# Patient Record
Sex: FEMALE | Race: WHITE | Employment: UNEMPLOYED | URBAN - METROPOLITAN AREA
[De-identification: names, ages, dates, MRNs, and addresses within clinical notes are randomized per-mention and may not be internally consistent; named-entity substitution may affect disease eponyms.]

---

## 2018-08-07 ENCOUNTER — OFFICE VISIT (OUTPATIENT)
Dept: FAMILY MEDICINE | Facility: CLINIC | Age: 63
End: 2018-08-07
Payer: MEDICARE

## 2018-08-07 VITALS
SYSTOLIC BLOOD PRESSURE: 132 MMHG | DIASTOLIC BLOOD PRESSURE: 77 MMHG | WEIGHT: 190 LBS | TEMPERATURE: 98.3 F | RESPIRATION RATE: 18 BRPM | OXYGEN SATURATION: 94 % | HEART RATE: 68 BPM | HEIGHT: 61 IN | BODY MASS INDEX: 35.87 KG/M2

## 2018-08-07 DIAGNOSIS — Z79.899 MEDICATION MANAGEMENT: Primary | ICD-10-CM

## 2018-08-07 DIAGNOSIS — Z00.00 ROUTINE GENERAL MEDICAL EXAMINATION AT A HEALTH CARE FACILITY: ICD-10-CM

## 2018-08-07 DIAGNOSIS — Z79.899 HIGH RISK MEDICATIONS (NOT ANTICOAGULANTS) LONG-TERM USE: Primary | ICD-10-CM

## 2018-08-07 DIAGNOSIS — R32 URINARY INCONTINENCE, UNSPECIFIED TYPE: ICD-10-CM

## 2018-08-07 DIAGNOSIS — F17.200 TOBACCO USE DISORDER: ICD-10-CM

## 2018-08-07 LAB
ALBUMIN SERPL-MCNC: 3.6 G/DL (ref 3.4–5)
ALP SERPL-CCNC: 166 U/L (ref 40–150)
ALT SERPL W P-5'-P-CCNC: 23 U/L (ref 0–50)
ANION GAP SERPL CALCULATED.3IONS-SCNC: 4 MMOL/L (ref 3–14)
AST SERPL W P-5'-P-CCNC: 14 U/L (ref 0–45)
BILIRUB SERPL-MCNC: 0.5 MG/DL (ref 0.2–1.3)
BUN SERPL-MCNC: 12 MG/DL (ref 7–30)
CALCIUM SERPL-MCNC: 9.2 MG/DL (ref 8.5–10.1)
CHLORIDE SERPL-SCNC: 106 MMOL/L (ref 94–109)
CO2 SERPL-SCNC: 32 MMOL/L (ref 20–32)
CREAT SERPL-MCNC: 0.83 MG/DL (ref 0.52–1.04)
GFR SERPL CREATININE-BSD FRML MDRD: 70 ML/MIN/1.7M2
GLUCOSE SERPL-MCNC: 113 MG/DL (ref 70–99)
POTASSIUM SERPL-SCNC: 4.7 MMOL/L (ref 3.4–5.3)
PROT SERPL-MCNC: 7 G/DL (ref 6.8–8.8)
SODIUM SERPL-SCNC: 142 MMOL/L (ref 133–144)

## 2018-08-07 PROCEDURE — 93000 ELECTROCARDIOGRAM COMPLETE: CPT | Performed by: FAMILY MEDICINE

## 2018-08-07 PROCEDURE — 99396 PREV VISIT EST AGE 40-64: CPT | Performed by: FAMILY MEDICINE

## 2018-08-07 PROCEDURE — 36415 COLL VENOUS BLD VENIPUNCTURE: CPT | Performed by: REGISTERED NURSE

## 2018-08-07 PROCEDURE — 80053 COMPREHEN METABOLIC PANEL: CPT | Performed by: REGISTERED NURSE

## 2018-08-07 RX ORDER — PROPRANOLOL HYDROCHLORIDE 10 MG/1
10 TABLET ORAL 3 TIMES DAILY
COMMUNITY
End: 2018-10-16

## 2018-08-07 RX ORDER — BUPROPION HYDROCHLORIDE 100 MG/1
100 TABLET, EXTENDED RELEASE ORAL DAILY
COMMUNITY

## 2018-08-07 RX ORDER — LAMOTRIGINE 100 MG/1
100 TABLET, EXTENDED RELEASE ORAL DAILY
COMMUNITY

## 2018-08-07 NOTE — PROGRESS NOTES
SUBJECTIVE:   CC: Mandi Araiza is an 62 year old woman who presents for preventive health visit.     Healthy Habits:    Do you get at least three servings of calcium containing foods daily (dairy, green leafy vegetables, etc.)? yes    Amount of exercise or daily activities, outside of work: 1 day(s) per week    Problems taking medications regularly No    Medication side effects: No    Have you had an eye exam in the past two years? yes    Do you see a dentist twice per year? no    Do you have sleep apnea, excessive snoring or daytime drowsiness?no      Hyperlipidemia Follow-Up      Rate your low fat/cholesterol diet?: good    Taking statin?  Yes, no muscle aches from statin    Other lipid medications/supplements?:  none    Depression and Anxiety Follow-Up    Status since last visit: Worsened     Other associated symptoms:None    Complicating factors:     Significant life event: Yes-  Family      Current substance abuse: None    No flowsheet data found.  MAGGIE-7 SCORE 1/18/2013 8/16/2013 2/7/2014   Total Score 6 6 8       PHQ-9  English  PHQ-9   Any Language  MAGGIE-7  Suicide Assessment Five-step Evaluation and Treatment (SAFE-T)    Today's PHQ-2 Score:   PHQ-2 ( 1999 [employer]) 8/7/2018 12/9/2013   Q1: Little interest or pleasure in doing things 0 0   Q2: Feeling down, depressed or hopeless 0 0   PHQ-2 Score 0 0       Abuse: Current or Past(Physical, Sexual or Emotional)- No  Do you feel safe in your environment - Yes    Social History   Substance Use Topics     Smoking status: Current Every Day Smoker     Packs/day: 0.50     Years: 40.00     Types: Cigarettes     Smokeless tobacco: Never Used     Alcohol use No     If you drink alcohol do you typically have >3 drinks per day or >7 drinks per week? No                     Reviewed orders with patient.  Reviewed health maintenance and updated orders accordingly - Yes  Labs reviewed in [facility]    Patient over age 50, mutual decision to screen reflected in health  "maintenance.    Pertinent mammograms are reviewed under the imaging tab.  History of abnormal Pap smear: Status post benign hysterectomy. Health Maintenance and Surgical History updated.     Reviewed and updated as needed this visit by clinical staff  Tobacco  Soc Hx        Reviewed and updated as needed this visit by Provider            ROS:  CONSTITUTIONAL: NEGATIVE for fever, chills, change in weight  INTEGUMENTARY/SKIN: NEGATIVE for worrisome rashes, moles or lesions  EYES: NEGATIVE for vision changes or irritation  ENT: NEGATIVE for ear, mouth and throat problems  RESP: NEGATIVE for significant cough or SOB  BREAST: NEGATIVE for masses, tenderness or discharge  CV: NEGATIVE for chest pain, palpitations or peripheral edema  GI: NEGATIVE for nausea, abdominal pain, heartburn, or change in bowel habits  : NEGATIVE for unusual urinary or vaginal symptoms. No vaginal bleeding.  MUSCULOSKELETAL: NEGATIVE for significant arthralgias or myalgia  NEURO: NEGATIVE for weakness, dizziness or paresthesias  PSYCHIATRIC: NEGATIVE for changes in mood or affect     OBJECTIVE:   /77  Pulse 68  Temp 98.3  F (36.8  C)  Resp 18  Ht 5' 1\" (1.549 m)  Wt 190 lb (86.2 kg)  SpO2 94%  BMI 35.9 kg/m2  EXAM:  GENERAL APPEARANCE: healthy, alert and no distress  EYES: Eyes grossly normal to inspection, PERRL and conjunctivae and sclerae normal  HENT: ear canals and TM's normal, nose and mouth without ulcers or lesions, oropharynx clear and oral mucous membranes moist  NECK: no adenopathy, no asymmetry, masses, or scars and thyroid normal to palpation  RESP: lungs clear to auscultation - no rales, rhonchi or wheezes  BREAST: normal without masses, tenderness or nipple discharge and no palpable axillary masses or adenopathy  CV: regular rate and rhythm, normal S1 S2, no S3 or S4, no murmur, click or rub, no peripheral edema and peripheral pulses strong  ABDOMEN: soft, nontender, no hepatosplenomegaly, no masses and bowel sounds " "normal  MS: no musculoskeletal defects are noted and gait is age appropriate without ataxia  SKIN: no suspicious lesions or rashes  NEURO: Normal strength and tone, sensory exam grossly normal, mentation intact and speech normal  PSYCH: mentation appears normal and affect normal/bright    Diagnostic Test Results:  none     ASSESSMENT/PLAN:   Mandi was seen today for physical.    Diagnoses and all orders for this visit:    High risk medications (not anticoagulants) long-term use  -     EKG 12-lead complete w/read - Clinics    Routine general medical examination at a health care facility    Urinary incontinence, unspecified type  -     OB/GYN REFERRAL    Tobacco use disorder    Other orders  -     Cancel: GLUCOSE        COUNSELING:   Reviewed preventive health counseling, as reflected in patient instructions       Regular exercise       Healthy diet/nutrition    BP Readings from Last 1 Encounters:   08/07/18 132/77     Estimated body mass index is 35.9 kg/(m^2) as calculated from the following:    Height as of this encounter: 5' 1\" (1.549 m).    Weight as of this encounter: 190 lb (86.2 kg).      Weight management plan: Discussed healthy diet and exercise guidelines and patient will follow up in 12 months in clinic to re-evaluate.     reports that she has been smoking Cigarettes.  She has a 20.00 pack-year smoking history. She has never used smokeless tobacco.  Tobacco Cessation Action Plan: Information offered: Patient not interested at this time    Counseling Resources:  ATP IV Guidelines  Pooled Cohorts Equation Calculator  Breast Cancer Risk Calculator  FRAX Risk Assessment  ICSI Preventive Guidelines  Dietary Guidelines for Americans, 2010  USDA's MyPlate  ASA Prophylaxis  Lung CA Screening    Chaz Tineo MD  [facility]  "

## 2018-08-07 NOTE — MR AVS SNAPSHOT
After Visit Summary   8/7/2018    Mandi Araiza    MRN: [id number]           Patient Information     Date Of Birth          1955        Visit Information        Provider Department      8/7/2018 10:20 AM Chaz Tineo MD [facility]        Today's Diagnoses     High risk medications (not anticoagulants) long-term use    -  1    Routine general medical examination at a health care facility        Urinary incontinence, unspecified type        Tobacco use disorder          Care Instructions      Preventive Health Recommendations  Female Ages 50 - 64    Yearly exam: See your health care provider every year in order to  o Review health changes.   o Discuss preventive care.    o Review your medicines if your doctor has prescribed any.      Get a Pap test every three years (unless you have an abnormal result and your provider advises testing more often).    If you get Pap tests with HPV test, you only need to test every 5 years, unless you have an abnormal result.     You do not need a Pap test if your uterus was removed (hysterectomy) and you have not had cancer.    You should be tested each year for STDs (sexually transmitted diseases) if you're at risk.     Have a mammogram every 1 to 2 years.    Have a colonoscopy at age 50, or have a yearly FIT test (stool test). These exams screen for colon cancer.      Have a cholesterol test every 5 years, or more often if advised.    Have a diabetes test (fasting glucose) every three years. If you are at risk for diabetes, you should have this test more often.     If you are at risk for osteoporosis (brittle bone disease), think about having a bone density scan (DEXA).    Shots: Get a flu shot each year. Get a tetanus shot every 10 years.    Nutrition:     Eat at least 5 servings of fruits and vegetables each day.    Eat whole-grain bread, whole-wheat pasta and brown rice instead of white grains and rice.    Get adequate Calcium and  Vitamin D.     Lifestyle    Exercise at least 150 minutes a week (30 minutes a day, 5 days a week). This will help you control your weight and prevent disease.    Limit alcohol to one drink per day.    No smoking.     Wear sunscreen to prevent skin cancer.     See your dentist every six months for an exam and cleaning.    See your eye doctor every 1 to 2 years.            Follow-ups after your visit        Additional Services     OB/GYN REFERRAL       Your provider has referred you to:  LADI: [facility] [phone]   [email or web address]    Please be aware that coverage of these services is subject to the terms and limitations of your health insurance plan.  Call member services at your health plan with any benefit or coverage questions.      Please bring the following with you to your appointment:    (1) Any X-Rays, CTs or MRIs which have been performed.  Contact the facility where they were done to arrange for  prior to your scheduled appointment.   (2) List of current medications   (3) This referral request   (4) Any documents/labs given to you for this referral                  Who to contact     If you have questions or need follow up information about today's clinic visit or your schedule please contact [facility] directly at [phone].  Normal or non-critical lab and imaging results will be communicated to you by MyChart, letter or phone within 4 business days after the clinic has received the results. If you do not hear from us within 7 days, please contact the clinic through MyChart or phone. If you have a critical or abnormal lab result, we will notify you by phone as soon as possible.  Submit refill requests through [employer] or call your pharmacy and they will forward the refill request to us. Please allow 3 business days for your refill to be completed.          Additional Information About Your Visit        Care EveryWhere ID      "This is your Care EveryWhere ID. This could be used by other organizations to access your [place] medical records  [id number]        Your Vitals Were     Pulse Temperature Respirations Height Pulse Oximetry BMI (Body Mass Index)    68 98.3  F (36.8  C) 18 5' 1\" (1.549 m) 94% 35.9 kg/m2       Blood Pressure from Last 3 Encounters:   08/07/18 132/77   08/27/14 94/66   02/07/14 129/79    Weight from Last 3 Encounters:   08/07/18 190 lb (86.2 kg)   02/07/14 183 lb (83 kg)   01/06/14 183 lb (83 kg)              We Performed the Following     EKG 12-lead complete w/read - Clinics     OB/GYN REFERRAL          Today's Medication Changes          These changes are accurate as of 8/7/18 10:51 AM.  If you have any questions, ask your nurse or doctor.               Stop taking these medicines if you haven't already. Please contact your care team if you have questions.     LORazepam 1 MG tablet   Commonly known as:  ATIVAN   Stopped by:  Chaz Tineo MD                    Primary Care Provider Office Phone # Fax #    Digna ARLEN Herron PA-C [phone]       [facility] [address]   [facility] [address]        Equal Access to Services     NICOLE MORTON AH: Hadii dinah ku hadasho Soomaali, waaxda luqadaha, qaybta kaalmada adeegyada, raf gatica hayrobert summers . So [facility] [phone].    ATENCIÓN: Si habla español, tiene a thompson disposición servicios gratuitos de asistencia lingüística. Llame al [phone].    We comply with applicable federal civil rights laws and [place] laws. We do not discriminate on the basis of race, color, national origin, age, disability, sex, sexual orientation, or gender identity.            Thank you!     Thank you for choosing [facility]  for your care. Our goal is always to provide you with excellent care. Hearing back from our patients is one way we can continue to improve our services. Please take a few minutes to complete the written survey " that you may receive in the mail after your visit with us. Thank you!             Your Updated Medication List - Protect others around you: Learn how to safely use, store and throw away your medicines at www.disposemymeds.org.          This list is accurate as of 8/7/18 10:51 AM.  Always use your most recent med list.                   Brand Name Dispense Instructions for use Diagnosis    atorvastatin 80 MG tablet    LIPITOR    90 tablet    Take 1 tablet (80 mg) by mouth daily    Hyperlipidemia LDL goal <130       B-12 1500 MCG Tbcr      Take 1,500 mcg by mouth daily        buPROPion 100 MG 12 hr tablet    WELLBUTRIN SR     Take 100 mg by mouth daily        DIPHENHIST PO      Take 25 mg by mouth At Bedtime        DULoxetine 60 MG EC capsule    CYMBALTA    90 capsule    Take 1 capsule (60 mg) by mouth daily    Mild major depression (H), Generalised anxiety disorder       FLUoxetine 20 MG capsule    PROZAC    90 capsule    Take 3 capsules (60 mg) by mouth daily    Generalised anxiety disorder, Mild major depression (H)       lamoTRIgine 100 MG 24 hr tablet    LaMICtal     Take 100 mg by mouth daily        meclizine 25 MG tablet    ANTIVERT    40 tablet    Take 1-2 tablets (25-50 mg) by mouth every 6 hours as needed for dizziness Up to a maximum of 4 tablets daily.        promethazine 25 MG tablet    PHENERGAN    20 tablet    Take 0.5-1 tablets (12.5-25 mg) by mouth every 6 hours as needed for nausea (or for dizziness or vertigo.)        propranolol 10 MG tablet    INDERAL     Take 10 mg by mouth 3 times daily        sulindac 200 MG tablet    CLINORIL    180 tablet    Take 1 tablet (200 mg) by mouth 2 times daily    LBP (low back pain)       VITAMIN B6 PO      Take 50 mg by mouth daily

## 2018-08-08 ASSESSMENT — PATIENT HEALTH QUESTIONNAIRE - PHQ9: SUM OF ALL RESPONSES TO PHQ QUESTIONS 1-9: 13

## 2018-08-09 ENCOUNTER — TELEPHONE (OUTPATIENT)
Dept: FAMILY MEDICINE | Facility: CLINIC | Age: 63
End: 2018-08-09

## 2018-08-09 NOTE — TELEPHONE ENCOUNTER
Patient called and is concerned about her EKG.  Patient has an OV with PCP to establish care.  Patient has EKG done that was ordered because by her psychiatrist. The reports was given to the psychiatrist and was told to contact her primary doctor regarding the results.  Will send to covering provider to review the EKG and advise.    Thank you  Shayy BROWN RN

## 2018-08-09 NOTE — TELEPHONE ENCOUNTER
EKG shows changes indicative of possible pulmonary disease. She should be seen by PCP for office visit for full evaluation. In the absence of any chest pain or other chest symptoms I would suggest office visit within throughout next month. If having chest symptoms then be seen immediately UC if need be.

## 2018-08-10 NOTE — TELEPHONE ENCOUNTER
I have attempted to contact this patient by phone with the following results: left message to return my call on answering machine.    Sheyla Dillard RN

## 2018-08-20 ENCOUNTER — OFFICE VISIT (OUTPATIENT)
Dept: FAMILY MEDICINE | Facility: CLINIC | Age: 63
End: 2018-08-20
Payer: MEDICARE

## 2018-08-20 VITALS
DIASTOLIC BLOOD PRESSURE: 60 MMHG | BODY MASS INDEX: 35.87 KG/M2 | RESPIRATION RATE: 18 BRPM | TEMPERATURE: 98.3 F | WEIGHT: 190 LBS | HEIGHT: 61 IN | SYSTOLIC BLOOD PRESSURE: 122 MMHG | HEART RATE: 78 BPM

## 2018-08-20 DIAGNOSIS — F17.200 TOBACCO USE DISORDER: ICD-10-CM

## 2018-08-20 DIAGNOSIS — Z12.11 SPECIAL SCREENING FOR MALIGNANT NEOPLASMS, COLON: Primary | ICD-10-CM

## 2018-08-20 DIAGNOSIS — R94.31 ABNORMAL ELECTROCARDIOGRAM: ICD-10-CM

## 2018-08-20 DIAGNOSIS — R32 URINARY INCONTINENCE, UNSPECIFIED TYPE: ICD-10-CM

## 2018-08-20 PROCEDURE — 99213 OFFICE O/P EST LOW 20 MIN: CPT | Performed by: FAMILY MEDICINE

## 2018-08-20 NOTE — MR AVS SNAPSHOT
After Visit Summary   8/20/2018    Mandi Araiza    MRN: [id number]           Patient Information     Date Of Birth          1955        Visit Information        Provider Department      8/20/2018 1:00 PM Chaz Tineo MD [facility]        Today's Diagnoses     Special screening for malignant neoplasms, colon    -  1    Abnormal electrocardiogram        Urinary incontinence, unspecified type        Tobacco use disorder          Care Instructions          Thank you for choosing [facility].  You may be receiving a survey in the mail from [facility] regarding your visit today.  Please take a few minutes to complete and return the survey to let us know how we are doing.      Our Clinic hours are:  Mondays    7:20 am - 7 pm  Tues -  Fri  7:20 am - 5 pm    Clinic Phone: [phone]    The clinic lab opens at 7:30 am Mon - Fri and appointments are required.    [facility]. [phone]  Monday  8 am - 7pm  Tues - Fri 8 am - 5:30 pm                 Follow-ups after your visit        Additional Services     GASTROENTEROLOGY ADULT REF PROCEDURE ONLY [facility] [phone]; No Provider Preference       Last Lab Result: Creatinine (mg/dL)       Date                     Value                 08/07/2018               0.83             ----------  There is no height or weight on file to calculate BMI.     Needed:  No  Language:  English    Patient will be contacted to schedule procedure.     Please be aware that coverage of these services is subject to the terms and limitations of your health insurance plan.  Call member services at your health plan with any benefit or coverage questions.  Any procedures must be performed at a [place] facility OR coordinated by your clinic's referral office.    Please bring the following with you to your appointment:    (1) Any X-Rays, CTs or MRIs which have been performed.  Contact the facility where they  were done to arrange for  prior to your scheduled appointment.    (2) List of current medications   (3) This referral request   (4) Any documents/labs given to you for this referral            OB/GYN REFERRAL       Your provider has referred you to:  [facility]: [facility] [phone]   [email or web address]    Please be aware that coverage of these services is subject to the terms and limitations of your health insurance plan.  Call member services at your health plan with any benefit or coverage questions.      Please bring the following with you to your appointment:    (1) Any X-Rays, CTs or MRIs which have been performed.  Contact the facility where they were done to arrange for  prior to your scheduled appointment.   (2) List of current medications   (3) This referral request   (4) Any documents/labs given to you for this referral                  Who to contact     If you have questions or need follow up information about today's clinic visit or your schedule please contact [facility] directly at [phone].  Normal or non-critical lab and imaging results will be communicated to you by MyChart, letter or phone within 4 business days after the clinic has received the results. If you do not hear from us within 7 days, please contact the clinic through MyChart or phone. If you have a critical or abnormal lab result, we will notify you by phone as soon as possible.  Submit refill requests through [employer] or call your pharmacy and they will forward the refill request to us. Please allow 3 business days for your refill to be completed.          Additional Information About Your Visit        Care EveryWhere ID     This is your Care EveryWhere ID. This could be used by other organizations to access your [place] medical records  [id number]        Your Vitals Were     Pulse Temperature Respirations Height BMI (Body Mass Index)       78 98.3  F  "(36.8  C) 18 5' 1\" (1.549 m) 35.9 kg/m2        Blood Pressure from Last 3 Encounters:   08/20/18 122/60   08/07/18 132/77   08/27/14 94/66    Weight from Last 3 Encounters:   08/20/18 190 lb (86.2 kg)   08/07/18 190 lb (86.2 kg)   02/07/14 183 lb (83 kg)              We Performed the Following     DEPRESSION ACTION PLAN (DAP)     GASTROENTEROLOGY ADULT REF PROCEDURE ONLY [facility] [phone]; No Provider Preference     OB/GYN REFERRAL        Primary Care Provider Office Phone # Fax #    Chaz Tineo [phone] [address] [facility] [address]        Equal Access to Services     NICOLE MORTON : Hadii aad ku hadasho Soomaali, waaxda luqadaha, qaybta kaalmada adeegyada, raf summers . So [facility] [phone].    ATENCIÓN: Si habla español, tiene a thompson disposición servicios gratuitos de asistencia lingüística. Llame al [phone].    We comply with applicable federal civil rights laws and [place] laws. We do not discriminate on the basis of race, color, national origin, age, disability, sex, sexual orientation, or gender identity.            Thank you!     Thank you for choosing [facility]  for your care. Our goal is always to provide you with excellent care. Hearing back from our patients is one way we can continue to improve our services. Please take a few minutes to complete the written survey that you may receive in the mail after your visit with us. Thank you!             Your Updated Medication List - Protect others around you: Learn how to safely use, store and throw away your medicines at www.disposemymeds.org.          This list is accurate as of 8/20/18  2:20 PM.  Always use your most recent med list.                   Brand Name Dispense Instructions for use Diagnosis    atorvastatin 80 MG tablet    LIPITOR    90 tablet    Take 1 tablet (80 mg) by mouth daily    Hyperlipidemia LDL goal <130       B-12 1500 MCG Tbcr      " Take 1,500 mcg by mouth daily        buPROPion 100 MG 12 hr tablet    WELLBUTRIN SR     Take 100 mg by mouth daily        DIPHENHIST PO      Take 25 mg by mouth At Bedtime        DULoxetine 60 MG EC capsule    CYMBALTA    90 capsule    Take 1 capsule (60 mg) by mouth daily    Mild major depression (H), Generalised anxiety disorder       FLUoxetine 20 MG capsule    PROZAC    90 capsule    Take 3 capsules (60 mg) by mouth daily    Generalised anxiety disorder, Mild major depression (H)       lamoTRIgine 100 MG 24 hr tablet    LaMICtal     Take 100 mg by mouth daily        meclizine 25 MG tablet    ANTIVERT    40 tablet    Take 1-2 tablets (25-50 mg) by mouth every 6 hours as needed for dizziness Up to a maximum of 4 tablets daily.        promethazine 25 MG tablet    PHENERGAN    20 tablet    Take 0.5-1 tablets (12.5-25 mg) by mouth every 6 hours as needed for nausea (or for dizziness or vertigo.)        propranolol 10 MG tablet    INDERAL     Take 10 mg by mouth 3 times daily        sulindac 200 MG tablet    CLINORIL    180 tablet    Take 1 tablet (200 mg) by mouth 2 times daily    LBP (low back pain)       VITAMIN B6 PO      Take 50 mg by mouth daily

## 2018-08-20 NOTE — PATIENT INSTRUCTIONS
Thank you for choosing [facility].  You may be receiving a survey in the mail from [facility] regarding your visit today.  Please take a few minutes to complete and return the survey to let us know how we are doing.      Our Clinic hours are:  Mondays    7:20 am - 7 pm  Tues - Fri  7:20 am - 5 pm    Clinic Phone: [phone]    The clinic lab opens at 7:30 am Mon - Fri and appointments are required.    [facility]. [phone]  Monday  8 am - 7pm  Tues - Fri 8 am - 5:30 pm

## 2018-08-20 NOTE — PROGRESS NOTES
"  SUBJECTIVE:   Mandi Araiza is a 62 year old female who presents to clinic today for the following health issues:      Chief Complaint   Patient presents with     Results     patient comes into the clinic to discuss her EKG results for 8/7/18             Problem list and histories reviewed & adjusted, as indicated.  Additional history:         Reviewed and updated as needed this visit by clinical staff  Tobacco  Allergies  Meds       Reviewed and updated as needed this visit by Provider      Further history obtained, clarified or corrected by physician:  She comes in because her EKG showed a rightward P axis suggesting possible pulmonary disease and her psychiatry clinic recommended she follow up.  She has no symptoms of chest pain or shortness of breath.  She is a long-term smoker.    OBJECTIVE:  /60  Pulse 78  Temp 98.3  F (36.8  C)  Resp 18  Ht 5' 1\" (1.549 m)  Wt 190 lb (86.2 kg)  BMI 35.9 kg/m2  LUNGS: clear to auscultation, normal breath sounds  CV: RRR without murmur  ABD: BS+, soft, nontender, no masses, no hepatosplenomegaly    ECG: The findings show rightward P axis with incomplete right bundle branch block but minimal change from ECG of 2014    ASSESSMENT:     Abnormal electrocardiogram  Special screening for malignant neoplasms, colon  Urinary incontinence, unspecified type  Tobacco use disorder    PLAN:  We discussed the ECG findings and given her lack of symptoms we talked about workup versus just monitoring and at this point she is comfortable with no further testing but she will call or return if she develops respiratory symptoms or any chest pains.  We talked about the likelihood that she is developing some COPD and that may be having some effect on the ECG and recommended that she stop smoking  She wants to see the gynecologist for her worsening urinary incontinence.    Orders Placed This Encounter     DEPRESSION ACTION PLAN (DAP)     GASTROENTEROLOGY ADULT REF PROCEDURE ONLY " [facility] [phone]; No Provider Preference     OB/GYN REFERRAL

## 2018-08-20 NOTE — LETTER
My Depression Action Plan  Name: Mandi Araiza   Date of Birth 1955  Date: 8/20/2018    My doctor: Digna Herron   My clinic: [facility]  [address]  [facility] [address]  [phone]          GREEN    ZONE   Good Control    What it looks like:     Things are going generally well. You have normal up s and down s. You may even feel depressed from time to time, but bad moods usually last less than a day.   What you need to do:  1. Continue to care for yourself (see self care plan)  2. Check your depression survival kit and update it as needed  3. Follow your physician s recommendations including any medication.  4. Do not stop taking medication unless you consult with your physician first.           YELLOW         ZONE Getting Worse    What it looks like:     Depression is starting to interfere with your life.     It may be hard to get out of bed; you may be starting to isolate yourself from others.    Symptoms of depression are starting to last most all day and this has happened for several days.     You may have suicidal thoughts but they are not constant.   What you need to do:     1. Call your care team, your response to treatment will improve if you keep your care team informed of your progress. Yellow periods are signs an adjustment may need to be made.     2. Continue your self-care, even if you have to fake it!    3. Talk to someone in your support network    4. Open up your depression survival kit           RED    ZONE Medical Alert - Get Help    What it looks like:     Depression is seriously interfering with your life.     You may experience these or other symptoms: You can t get out of bed most days, can t work or engage in other necessary activities, you have trouble taking care of basic hygiene, or basic responsibilities, thoughts of suicide or death that will not go away, self-injurious behavior.     What you need to do:  1. Call your care team  and request a same-day appointment. If they are not available (weekends or after hours) call your local crisis line, emergency room or 911.            Depression Self Care Plan / Survival Kit    Self-Care for Depression  Here s the deal. Your body and mind are really not as separate as most people think.  What you do and think affects how you feel and how you feel influences what you do and think. This means if you do things that people who feel good do, it will help you feel better.  Sometimes this is all it takes.  There is also a place for medication and therapy depending on how severe your depression is, so be sure to consult with your medical provider and/ or Behavioral Health Consultant if your symptoms are worsening or not improving.     In order to better manage my stress, I will:    Exercise  Get some form of exercise, every day. This will help reduce pain and release endorphins, the  feel good  chemicals in your brain. This is almost as good as taking antidepressants!  This is not the same as joining a gym and then never going! (they count on that by the way ) It can be as simple as just going for a walk or doing some gardening, anything that will get you moving.      Hygiene   Maintain good hygiene (Get out of bed in the morning, Make your bed, Brush your teeth, Take a shower, and Get dressed like you were going to work, even if you are unemployed).  If your clothes don't fit try to get ones that do.    Diet  I will strive to eat foods that are good for me, drink plenty of water, and avoid excessive sugar, caffeine, alcohol, and other mood-altering substances.  Some foods that are helpful in depression are: complex carbohydrates, B vitamins, flaxseed, fish or fish oil, fresh fruits and vegetables.    Psychotherapy  I agree to participate in Individual Therapy (if recommended).    Medication  If prescribed medications, I agree to take them.  Missing doses can result in serious side effects.  I understand  that drinking alcohol, or other illicit drug use, may cause potential side effects.  I will not stop my medication abruptly without first discussing it with my provider.    Staying Connected With Others  I will stay in touch with my friends, family members, and my primary care provider/team.    Use your imagination  Be creative.  We all have a creative side; it doesn t matter if it s oil painting, sand castles, or mud pies! This will also kick up the endorphins.    Witness Beauty  (AKA stop and smell the roses) Take a look outside, even in mid-winter. Notice colors, textures. Watch the squirrels and birds.     Service to others  Be of service to others.  There is always someone else in need.  By helping others we can  get out of ourselves  and remember the really important things.  This also provides opportunities for practicing all the other parts of the program.    Humor  Laugh and be silly!  Adjust your TV habits for less news and crime-drama and more comedy.    Control your stress  Try breathing deep, massage therapy, biofeedback, and meditation. Find time to relax each day.     My support system    Clinic Contact:  Phone number:    Contact 1:  Phone number:    Contact 2:  Phone number:    [employer]/:  Phone number:    Therapist:  Phone number:    Local crisis center:    Phone number:    Other community support:  Phone number:

## 2018-08-21 ENCOUNTER — HEALTH MAINTENANCE LETTER (OUTPATIENT)
Age: 63
End: 2018-08-21

## 2018-10-16 ENCOUNTER — OFFICE VISIT (OUTPATIENT)
Dept: FAMILY MEDICINE | Facility: CLINIC | Age: 63
End: 2018-10-16
Payer: MEDICARE

## 2018-10-16 VITALS
HEART RATE: 90 BPM | OXYGEN SATURATION: 96 % | WEIGHT: 192 LBS | TEMPERATURE: 98.3 F | BODY MASS INDEX: 36.25 KG/M2 | DIASTOLIC BLOOD PRESSURE: 60 MMHG | RESPIRATION RATE: 18 BRPM | SYSTOLIC BLOOD PRESSURE: 122 MMHG | HEIGHT: 61 IN

## 2018-10-16 DIAGNOSIS — K04.7 TOOTH ABSCESS: ICD-10-CM

## 2018-10-16 DIAGNOSIS — E78.5 HYPERLIPIDEMIA LDL GOAL <130: ICD-10-CM

## 2018-10-16 DIAGNOSIS — Z23 NEED FOR PROPHYLACTIC VACCINATION AND INOCULATION AGAINST INFLUENZA: Primary | ICD-10-CM

## 2018-10-16 PROCEDURE — 90686 IIV4 VACC NO PRSV 0.5 ML IM: CPT | Performed by: FAMILY MEDICINE

## 2018-10-16 PROCEDURE — G0008 ADMIN INFLUENZA VIRUS VAC: HCPCS | Performed by: FAMILY MEDICINE

## 2018-10-16 PROCEDURE — 99213 OFFICE O/P EST LOW 20 MIN: CPT | Mod: 25 | Performed by: FAMILY MEDICINE

## 2018-10-16 RX ORDER — PROPRANOLOL HCL 60 MG
60 CAPSULE, EXTENDED RELEASE 24HR ORAL DAILY
COMMUNITY

## 2018-10-16 RX ORDER — PENICILLIN V POTASSIUM 500 MG/1
500 TABLET, FILM COATED ORAL 3 TIMES DAILY
Qty: 30 TABLET | Refills: 0 | Status: SHIPPED | OUTPATIENT
Start: 2018-10-16 | End: 2019-02-13

## 2018-10-16 RX ORDER — ATORVASTATIN CALCIUM 80 MG/1
80 TABLET, FILM COATED ORAL DAILY
Qty: 90 TABLET | Refills: 3 | Status: SHIPPED | OUTPATIENT
Start: 2018-10-16

## 2018-10-16 RX ORDER — ATORVASTATIN CALCIUM 80 MG/1
80 TABLET, FILM COATED ORAL DAILY
Qty: 90 TABLET | Refills: 3 | Status: SHIPPED | OUTPATIENT
Start: 2018-10-16 | End: 2018-10-16

## 2018-10-16 NOTE — PROGRESS NOTES
"  Injectable Influenza Immunization Documentation    1.  Is the person to be vaccinated sick today?   No    2. Does the person to be vaccinated have an allergy to a component   of the vaccine?   No  Egg Allergy Algorithm Link    3. Has the person to be vaccinated ever had a serious reaction   to influenza vaccine in the past?   No    4. Has the person to be vaccinated ever had Guillain-Barré syndrome?   No    Form completed by Martha Saenz CMA      SUBJECTIVE:   Mandi Araiza is a 62 year old female who presents to clinic today for the following health issues:    Chief Complaint   Patient presents with     Dental Pain     tooth bottom left is painful      Flu Shot     Recheck Medication     refills          Medication Followup of  Lipitor     Taking Medication as prescribed: yes    Side Effects:  None    Medication Helping Symptoms:  yes           Problem list and histories reviewed & adjusted, as indicated.  Additional history: as documented        Reviewed and updated as needed this visit by clinical staff  Tobacco  Allergies  Meds  Med Hx  Surg Hx  Fam Hx  Soc Hx      Reviewed and updated as needed this visit by Provider       OBJECTIVE:  /60  Pulse 90  Temp 98.3  F (36.8  C)  Resp 18  Ht 5' 1\" (1.549 m)  Wt 192 lb (87.1 kg)  LMP 10/16/2011  SpO2 96%  BMI 36.28 kg/m2  HEAD: AT/NC  EYES: PERRLA, EOMI, Sclerae clear, Fundi normal with sharp discs  EARS: TMs clear, canals normal  MOUTH: Left lower molar area there is tenderness on 1 of the teeth and there is swelling and this is visible from the outside against the left cheek  NOSE & THROAT: clear    ASSESSMENT:     Tooth abscess  Hyperlipidemia LDL goal <130  Need for prophylactic vaccination and inoculation against influenza    PLAN:  She needs a dental visit which she says she is setting up.    Orders Placed This Encounter     FLU VACCINE, SPLIT VIRUS, IM (QUADRIVALENT) [62190]- >3 YRS     Vaccine Administration, Initial [95258]     " propranolol (INDERAL LA) 60 MG 24 hr capsule     DISCONTD: atorvastatin (LIPITOR) 80 MG tablet     penicillin V potassium (VEETID) 500 MG tablet     atorvastatin (LIPITOR) 80 MG tablet

## 2018-10-16 NOTE — PATIENT INSTRUCTIONS
Thank you for choosing [facility].  You may be receiving a survey in the mail from [facility] regarding your visit today.  Please take a few minutes to complete and return the survey to let us know how we are doing.      Our Clinic hours are:  Mondays    7:20 am - 7 pm  Tues - Fri  7:20 am - 5 pm    Clinic Phone: [phone]    The clinic lab opens at 7:30 am Mon - Fri and appointments are required.    [facility]. [phone]  Monday  8 am - 7pm  Tues - Fri 8 am - 5:30 pm

## 2018-10-16 NOTE — MR AVS SNAPSHOT
After Visit Summary   10/16/2018    Mandi Araiza    MRN: [id number]           Patient Information     Date Of Birth          1955        Visit Information        Provider Department      10/16/2018 11:00 AM Chaz Tineo MD [facility]        Today's Diagnoses     Need for prophylactic vaccination and inoculation against influenza    -  1    Tooth abscess        Hyperlipidemia LDL goal <130          Care Instructions          Thank you for choosing [facility].  You may be receiving a survey in the mail from [facility] regarding your visit today.  Please take a few minutes to complete and return the survey to let us know how we are doing.      Our Clinic hours are:  Mondays    7:20 am - 7 pm  Tues -  Fri  7:20 am - 5 pm    Clinic Phone: [phone]    The clinic lab opens at 7:30 am Mon - Fri and appointments are required.    [facility]  Ph. [phone]  Monday  8 am - 7pm  Tues - Fri 8 am - 5:30 pm                 Follow-ups after your visit        Who to contact     If you have questions or need follow up information about today's clinic visit or your schedule please contact [facility] directly at [phone].  Normal or non-critical lab and imaging results will be communicated to you by MyChart, letter or phone within 4 business days after the clinic has received the results. If you do not hear from us within 7 days, please contact the clinic through MyChart or phone. If you have a critical or abnormal lab result, we will notify you by phone as soon as possible.  Submit refill requests through [employer] or call your pharmacy and they will forward the refill request to us. Please allow 3 business days for your refill to be completed.          Additional Information About Your Visit        Care EveryWhere ID     This is your Care EveryWhere ID. This could be used by other organizations to access your [facility]  "records  [id number]        Your Vitals Were     Pulse Temperature Respirations Height Last Period Pulse Oximetry    90 98.3  F (36.8  C) 18 5' 1\" (1.549 m) 10/16/2011 96%    BMI (Body Mass Index)                   36.28 kg/m2            Blood Pressure from Last 3 Encounters:   10/16/18 122/60   08/20/18 122/60   08/07/18 132/77    Weight from Last 3 Encounters:   10/16/18 192 lb (87.1 kg)   08/20/18 190 lb (86.2 kg)   08/07/18 190 lb (86.2 kg)              We Performed the Following     FLU VACCINE, SPLIT VIRUS, IM (QUADRIVALENT) [23403]- >3 YRS     Vaccine Administration, Initial [83357]          Today's Medication Changes          These changes are accurate as of 10/16/18 12:55 PM.  If you have any questions, ask your nurse or doctor.               Start taking these medicines.        Dose/Directions    atorvastatin 80 MG tablet   Commonly known as:  LIPITOR   Used for:  Hyperlipidemia LDL goal <130   Started by:  Chaz Tineo MD        Dose:  80 mg   Take 1 tablet (80 mg) by mouth daily   Quantity:  90 tablet   Refills:  3       penicillin V potassium 500 MG tablet   Commonly known as:  VEETID   Used for:  Tooth abscess   Started by:  Chaz Tineo MD        Dose:  500 mg   Take 1 tablet (500 mg) by mouth 3 times daily   Quantity:  30 tablet   Refills:  0         These medicines have changed or have updated prescriptions.        Dose/Directions    propranolol 60 MG 24 hr capsule   Commonly known as:  INDERAL LA   This may have changed:  Another medication with the same name was removed. Continue taking this medication, and follow the directions you see here.   Changed by:  Chaz Tineo MD        Dose:  60 mg   Take 60 mg by mouth daily   Refills:  0         Stop taking these medicines if you haven't already. Please contact your care team if you have questions.     FLUoxetine 20 MG capsule   Commonly known as:  PROZAC   Stopped by:  Chaz Tineo MD           meclizine 25 MG tablet   Commonly known as: "  ANTIVERT   Stopped by:  Chaz Tineo MD                Where to get your medicines      These medications [place] [facility] [place] - [address] [facility] [address]  [address] [facility] [address]     Phone:  [phone]     penicillin V potassium 500 MG tablet         These medications were sent to [facility] - [place], [place] - [address]  [address] [facility] [address], [facility] [address]     Phone:  [phone]     atorvastatin 80 MG tablet                Primary Care Provider Office Phone # Fax #    Chaz Tineo [phone] [address]  [facility] [address]        Equal Access to Services     [facility]: Hadii aad willie hadasho Soomaali, waaxda luqadaha, qaybta kaalmada adeegyada, raf gatica hayrobert summers . [facility] [phone].    ATENCIÓN: Si habla español, tiene a thompson disposición servicios gratuitos de asistencia lingüística. [facility] [phone].    We comply with applicable federal civil rights laws and [place] laws. We do not discriminate on the basis of race, color, national origin, age, disability, sex, sexual orientation, or gender identity.            Thank you!     Thank you for choosing [facility][note 3]  for your care. Our goal is always to provide you with excellent care. Hearing back from our patients is one way we can continue to improve our services. Please take a few minutes to complete the written survey that you may receive in the mail after your visit with us. Thank you!             Your Updated Medication List - Protect others around you: Learn how to safely use, store and throw away your medicines at www.disposemymeds.org.          This list is accurate as of 10/16/18 12:55 PM.  Always use your most recent med list.                   Brand Name Dispense Instructions for use Diagnosis    atorvastatin 80 MG tablet    LIPITOR    90  tablet    Take 1 tablet (80 mg) by mouth daily    Hyperlipidemia LDL goal <130       B-12 1500 MCG Tbcr      Take 1,500 mcg by mouth daily        buPROPion 100 MG 12 hr tablet    WELLBUTRIN SR     Take 100 mg by mouth daily        DIPHENHIST PO      Take 25 mg by mouth At Bedtime        DULoxetine 60 MG EC capsule    CYMBALTA    90 capsule    Take 1 capsule (60 mg) by mouth daily    Mild major depression (H), Generalised anxiety disorder       lamoTRIgine 100 MG 24 hr tablet    LaMICtal     Take 100 mg by mouth daily        penicillin V potassium 500 MG tablet    VEETID    30 tablet    Take 1 tablet (500 mg) by mouth 3 times daily    Tooth abscess       promethazine 25 MG tablet    PHENERGAN    20 tablet    Take 0.5-1 tablets (12.5-25 mg) by mouth every 6 hours as needed for nausea (or for dizziness or vertigo.)        propranolol 60 MG 24 hr capsule    INDERAL LA     Take 60 mg by mouth daily        sulindac 200 MG tablet    CLINORIL    30 tablet    TAKE 1 TABLET BY MOUTH TWICE A DAY    Low back pain, unspecified back pain laterality, unspecified chronicity, with sciatica presence unspecified       VITAMIN B6 PO      Take 50 mg by mouth daily

## 2018-11-13 DIAGNOSIS — M54.5 LOW BACK PAIN, UNSPECIFIED BACK PAIN LATERALITY, UNSPECIFIED CHRONICITY, WITH SCIATICA PRESENCE UNSPECIFIED: ICD-10-CM

## 2018-11-13 RX ORDER — SULINDAC 200 MG/1
TABLET ORAL
Qty: 56 TABLET | Refills: 3 | Status: SHIPPED | OUTPATIENT
Start: 2018-11-13 | End: 2019-02-25

## 2018-11-13 NOTE — TELEPHONE ENCOUNTER
Clinoril 200 mg 1 tab bid refill.  Last written 9/7/18 #30 + 3 refills.  Last seen 8/20/18.  Advise.  KPavelkRHEATHER

## 2018-12-26 ENCOUNTER — TELEPHONE (OUTPATIENT)
Dept: FAMILY MEDICINE | Facility: CLINIC | Age: 63
End: 2018-12-26

## 2018-12-26 NOTE — TELEPHONE ENCOUNTER
Panel Management Review      Patient has the following on her problem list:     Depression / Dysthymia review    Measure:  Needs PHQ-9 score of 4 or less during index window.  Administer PHQ-9 and if score is 5 or more, send encounter to provider for next steps.    5 - 7 month window range:     PHQ-9 SCORE 8/16/2013 2/7/2014 8/7/2018   PHQ-9 Total Score 13 15 -   PHQ-9 Total Score - - 13       If PHQ-9 recheck is 5 or more, route to provider for next steps.    Patient is due for:  None      Composite cancer screening  Chart review shows that this patient is due/due soon for the following Mammogram and Colonoscopy  Summary:    Patient is due/failing the following:   COLONOSCOPY and MAMMOGRAM    Action needed:   Patient needs referral/order: mammogram and colon/fit    Type of outreach:    Pt notified of Hm screening    Questions for provider review:    None                                                                                                                                    Latisha Langford MA       Chart routed to Care Team .

## 2019-02-08 DIAGNOSIS — K21.9 GASTROESOPHAGEAL REFLUX DISEASE WITHOUT ESOPHAGITIS: Primary | ICD-10-CM

## 2019-02-08 NOTE — TELEPHONE ENCOUNTER
I left a message for the patient to return my call. Is she requesting this? Not current on med list and not on d/c'd med list.       Delia SKY RN

## 2019-02-11 NOTE — TELEPHONE ENCOUNTER
LM to call clinic/RN concerning Prilosec refill.  Not on present or past med list.  KpaChristina

## 2019-02-11 NOTE — TELEPHONE ENCOUNTER
Prilosec refill.  Not on med list.  Pt has been on this med, last written by an [facility] Physician.  Med didn't get listed as historical when seen on 8/17/18.  Med pended.  Advise.  [place]

## 2019-02-13 ENCOUNTER — HOSPITAL ENCOUNTER (EMERGENCY)
Facility: CLINIC | Age: 64
Discharge: HOME | End: 2019-02-13
Attending: PHYSICIAN ASSISTANT | Admitting: PHYSICIAN ASSISTANT
Payer: MEDICARE

## 2019-02-13 ENCOUNTER — ALLIED HEALTH/NURSE VISIT (OUTPATIENT)
Dept: FAMILY MEDICINE | Facility: CLINIC | Age: 64
End: 2019-02-13
Payer: MEDICARE

## 2019-02-13 VITALS
DIASTOLIC BLOOD PRESSURE: 85 MMHG | TEMPERATURE: 97.9 F | BODY MASS INDEX: 36.66 KG/M2 | OXYGEN SATURATION: 98 % | RESPIRATION RATE: 16 BRPM | WEIGHT: 194 LBS | SYSTOLIC BLOOD PRESSURE: 158 MMHG

## 2019-02-13 DIAGNOSIS — M54.5 LOW BACK PAIN, UNSPECIFIED BACK PAIN LATERALITY, UNSPECIFIED CHRONICITY, WITH SCIATICA PRESENCE UNSPECIFIED: Primary | ICD-10-CM

## 2019-02-13 DIAGNOSIS — M54.50 RIGHT-SIDED LOW BACK PAIN WITHOUT SCIATICA: ICD-10-CM

## 2019-02-13 PROCEDURE — 96372 THER/PROPH/DIAG INJ SC/IM: CPT

## 2019-02-13 PROCEDURE — 25000128 H RX IP 250 OP 636: Performed by: PHYSICIAN ASSISTANT

## 2019-02-13 PROCEDURE — G0463 HOSPITAL OUTPT CLINIC VISIT: HCPCS | Mod: 25

## 2019-02-13 PROCEDURE — 99207 ZZC NO CHARGE NURSE ONLY: CPT

## 2019-02-13 PROCEDURE — 99213 OFFICE O/P EST LOW 20 MIN: CPT | Mod: Z6 | Performed by: PHYSICIAN ASSISTANT

## 2019-02-13 RX ORDER — TRAMADOL HYDROCHLORIDE 50 MG/1
50 TABLET ORAL EVERY 6 HOURS PRN
Qty: 10 TABLET | Refills: 0 | Status: SHIPPED | OUTPATIENT
Start: 2019-02-13 | End: 2019-02-16

## 2019-02-13 RX ORDER — KETOROLAC TROMETHAMINE 30 MG/ML
30 INJECTION, SOLUTION INTRAMUSCULAR; INTRAVENOUS ONCE
Status: COMPLETED | OUTPATIENT
Start: 2019-02-13 | End: 2019-02-13

## 2019-02-13 RX ORDER — METHYLPREDNISOLONE 4 MG
TABLET, DOSE PACK ORAL
Qty: 21 TABLET | Refills: 0 | Status: SHIPPED | OUTPATIENT
Start: 2019-02-13 | End: 2019-04-22

## 2019-02-13 RX ADMIN — KETOROLAC TROMETHAMINE 30 MG: 30 INJECTION, SOLUTION INTRAMUSCULAR at 15:13

## 2019-02-13 NOTE — ED AVS SNAPSHOT
[facility] Emergency Department  [address] [facility] [address]  Phone:  [phone]  Fax:  [phone]                                    Mandi Araiza   MRN: [id number]    Department:  [facility] Emergency Department   Date of Visit:  2/13/2019           After Visit Summary Signature Page    I have received my discharge instructions, and my questions have been answered. I have discussed any challenges I see with this plan with the nurse or doctor.    ..........................................................................................................................................  Patient/Patient Representative Signature      ..........................................................................................................................................  Patient Representative Print Name and Relationship to Patient    ..................................................               ................................................  Date                                   Time    ..........................................................................................................................................  Reviewed by Signature/Title    ...................................................              ..............................................  Date                                               Time          22EPIC Rev 08/18

## 2019-02-13 NOTE — PROGRESS NOTES
Pt in lobby with complaint of back pain.  No appts this afternoon.  Recommended the ER and pt agreed.  KpavelRn

## 2019-02-13 NOTE — ED PROVIDER NOTES
History     Chief Complaint   Patient presents with     Back Pain     mid back pain from moving boxes      HPI  Mandi Araiza is a 63 year old female who presents to the urgent care with concern over low back pain is been present for the last 3-4 days.  Patient has a history of degenerative disc disease and is on long-term sulindac.  She states that she helped a friend move some heavy objects over the weekend which has exacerbation of her pain.  She complains of pain on the right low back.  She does have some radiation down her left leg.  She denies any fever, chills, myalgias, cough, dyspnea, wheezing, nausea, vomiting, dysuria, hematuria, change in bowel or bladder control or saddle paresthesias.  She has attempted to treat with tylenol and ibuprofen without any relief.     Allergies:  Allergies   Allergen Reactions     Nkda [No Known Drug Allergies]      Problem List:    Patient Active Problem List    Diagnosis Date Noted     Tobacco use disorder 08/07/2018     Priority: Medium     Cervicalgia 02/07/2014     Priority: Medium     CTS (carpal tunnel syndrome) 02/07/2014     Priority: Medium     Elevated blood pressure reading without diagnosis of hypertension 12/09/2013     Priority: Medium     Lung nodules 08/16/2013     Priority: Medium     Incidental.   Recheck CT in 6 months (2/2014)       Vitamin D deficiency 11/29/2012     Priority: Medium     Advanced directives, counseling/discussion 11/26/2012     Priority: Medium     Patient does not have an Advance/Health Care Directive (HCD), requests blank HCD form.    Latisha Langford  November 26, 2012         Hyperlipidemia LDL goal <130 11/26/2012     Priority: Medium     Generalized anxiety disorder 11/26/2012     Priority: Medium     Seeing Lynda Willoughby at [place] in [place]  Mayi Palmer - [facility] NP  Diagnosis updated by automated process. Provider to review and confirm.       Mild major depression (H) 11/26/2012      Priority: Medium      Past Medical History:    Past Medical History:   Diagnosis Date     Kidney stone 12/6/2010     Past Surgical History:    Past Surgical History:   Procedure Laterality Date     CARPAL TUNNEL RELEASE RT/LT  11/2012    rt     CARPAL TUNNEL RELEASE RT/LT  1/2013    left     EXCISE TOENAIL(S)  2/20/2013    Procedure: EXCISE TOENAIL(S);  Excision of accessory ossicle and ingrown toenail repair right great toe;  Surgeon: Kristian Baer DPM;  Location: [facility] OR     HYSTERECTOMY, PAP NO LONGER INDICATED  7/5/2011    fibroid, cyst      LASER HOLMIUM LITHOTRIPSY URETER(S), INSERT STENT, COMBINED  11/4/2013    Procedure: COMBINED CYSTOSCOPY, URETEROSCOPY, LASER HOLMIUM LITHOTRIPSY URETER(S), INSERT STENT;  Right ureteroscopy, laser lithotripsy, right ureteral stent placement;  Surgeon: Angel Abbott MD;  Location: [facility] OR     Family History:    Family History   Problem Relation Age of Onset     Respiratory Mother         copd     Cardiovascular Father      Cardiovascular Brother      Gastrointestinal Disease Brother         chrones     Cardiovascular Sister 40        stents     Alcohol/Drug Sister         meth addiction     Social History:  Marital Status:  Single [1]  Social History     Tobacco Use     Smoking status: Current Every Day Smoker     Packs/day: 0.50     Years: 40.00     Pack years: 20.00     Types: Cigarettes     Smokeless tobacco: Never Used   Substance Use Topics     Alcohol use: No     Drug use: No      Medications:      atorvastatin (LIPITOR) 80 MG tablet   buPROPion (WELLBUTRIN SR) 100 MG 12 hr tablet   DULoxetine (CYMBALTA) 60 MG capsule   lamoTRIgine (LAMICTAL) 100 MG 24 hr tablet   omeprazole (PRILOSEC) 20 MG DR capsule   propranolol (INDERAL LA) 60 MG 24 hr capsule   sulindac (CLINORIL) 200 MG tablet     Review of Systems   Constitutional: Negative for chills and fever.   Respiratory: Negative for cough and shortness of breath.    Cardiovascular: Negative for chest pain.    Gastrointestinal: Negative for abdominal pain, diarrhea, nausea and vomiting.   Genitourinary: Negative for dysuria, flank pain, frequency, hematuria and urgency.   Musculoskeletal: Positive for back pain. Negative for neck pain.   Skin: Negative for color change, rash and wound.   Neurological: Negative for weakness and numbness.     Physical Exam   BP: 158/85  Heart Rate: 63  Temp: 97.9  F (36.6  C)  Resp: 16  Weight: 88 kg (194 lb)  SpO2: 98 %      Physical Exam   Constitutional: She appears well-developed and well-nourished. No distress.   HENT:   Head: Normocephalic and atraumatic.   Cardiovascular: Normal rate, regular rhythm and normal heart sounds. Exam reveals no gallop and no friction rub.   No murmur heard.  Pulmonary/Chest: Effort normal and breath sounds normal. No respiratory distress. She has no wheezes. She has no rales.   Musculoskeletal:        Thoracic back: Normal.        Lumbar back: She exhibits decreased range of motion, tenderness and pain. She exhibits no swelling, no deformity and no laceration.        Back:    Neurological:   Reflex Scores:       Patellar reflexes are 2+ on the right side and 2+ on the left side.  Skin: Skin is warm and dry. No abrasion, no ecchymosis and no rash noted. No erythema.   Psychiatric: She has a normal mood and affect.     ED Course        Procedures        Critical Care time:  none        No results found for this or any previous visit (from the past 24 hour(s)).    Medications   ketorolac (TORADOL) injection 30 mg (not administered)     Assessments & Plan (with Medical Decision Making)     I have reviewed the nursing notes.    I have reviewed the findings, diagnosis, plan and need for follow up with the patient.        Medication List      Started    methylPREDNISolone 4 MG tablet therapy pack  Commonly known as:  MEDROL DOSEPAK  Follow Package Directions     traMADol 50 MG tablet  Commonly known as:  ULTRAM  50 mg, Oral, EVERY 6 HOURS PRN          Final  diagnoses:   Right-sided low back pain without sciatica     63-year-old female presents to the urgent care with concern over right-sided low back pain which has been present for the last 3-4 days after she attempted to help a friend move.  She had stable vital signs upon arrival.  Physical exam findings as described above.  Differential for her symptoms would include exacerbation of her DDD, spinal stenosis, herniated disc, spondylolisthesis, muscle strain, spasm low suspicion for sciatica, cauda equina syndrome, epidural abscess or other emergent need for MRI.  I did discuss risk/benefits of obtaining x-ray and patient agreed to defer at this time.  She requested prescription for steroid to help decrease inflammation and pain.  As well as tramadol which she has been on previously as she is intolerant of opioids due to GI complaints.  I did agree to provide both of these.  Follow-up with primary care provider or back specialist if no improvement within the next 3-5 days.  Worrisome reasons to return to the ER/UC sooner discussed    Disclaimer: This note consists of symbols derived from keyboarding, dictation, and/or voice recognition software. As a result, there may be errors in the script that have gone undetected.  Please consider this when interpreting information found in the chart.    2/13/2019   [facility] EMERGENCY DEPARTMENT     Catrina Ferrara PA-C  02/15/19 [id number]

## 2019-02-15 ASSESSMENT — ENCOUNTER SYMPTOMS
DIARRHEA: 0
VOMITING: 0
FEVER: 0
FLANK PAIN: 0
COUGH: 0
WEAKNESS: 0
BACK PAIN: 1
HEMATURIA: 0
ABDOMINAL PAIN: 0
NECK PAIN: 0
COLOR CHANGE: 0
NAUSEA: 0
DYSURIA: 0
SHORTNESS OF BREATH: 0
FREQUENCY: 0
NUMBNESS: 0
WOUND: 0
CHILLS: 0

## 2019-02-25 DIAGNOSIS — M54.5 LOW BACK PAIN, UNSPECIFIED BACK PAIN LATERALITY, UNSPECIFIED CHRONICITY, WITH SCIATICA PRESENCE UNSPECIFIED: ICD-10-CM

## 2019-02-25 RX ORDER — SULINDAC 200 MG/1
TABLET ORAL
Qty: 56 TABLET | Refills: 3 | Status: SHIPPED | OUTPATIENT
Start: 2019-02-25 | End: 2019-07-03

## 2019-02-25 NOTE — TELEPHONE ENCOUNTER
Sulindac refill.  Last written 11/13/18 #56 + 3 refills.  Low back pain.  Last seen 8/20/18.  Advise.  Ye

## 2019-03-11 ENCOUNTER — TELEPHONE (OUTPATIENT)
Dept: FAMILY MEDICINE | Facility: CLINIC | Age: 64
End: 2019-03-11

## 2019-03-11 NOTE — LETTER
[facility]  [address]  [facility] [address]  [phone]  March 11, 2019    Mandi Araiza  [address] [address]    Dear Mandi,    We care about your health and have reviewed your health plan including your medical conditions, medication list, and lab results.  Based on this review, it is recommended that you follow up regarding the following health topic(s):     -Depression  -Breast Cancer Screening  -Colon Cancer Screening    We recommend you take the following action(s):  -schedule a WELLNESS (Physical) APPOINTMENT.  We will perform the following labs: Lipids (fasting cholesterol - nothing to eat except water and/or meds for 8-10 hours).  -schedule a MAMMOGRAM which is due. Please disregard this reminder if you have had this exam elsewhere within the last 1-2 years please let us know so we can update your records.  -schedule a COLONOSCOPY to look for colon cancer (due every 10 years or 5 years in higher risk situations.)  Colonoscopies can prevent 90-95% of colon cancer deaths.  Problem lesions can be removed before they ever become cancer.  If you do not wish to do a colonoscopy or cannot afford to do one at this time, there is another option called a Fecal Immunochemical Occult Blood Test (FIT) a take home stool sample kit.  It does not replace the colonoscopy for colorectal cancer screening, but it can detect hidden bleeding in the lower colon.  It does need to be repeated every year and if a positive result is obtained, you would be referred for a colonoscopy.  If you have completed either one of these tests at another facility, please have the records sent to our clinic for our records.    Please call us at [phone] (or use [employer]) to address the above recommendations.     Thank you for trusting [facility] and we appreciate the opportunity to serve you.  We look forward to supporting your healthcare needs in the  future.    Healthy Regards,      Your Health Care Team  [facility]

## 2019-03-11 NOTE — TELEPHONE ENCOUNTER
Panel Management Review      Patient has the following on her problem list:     Depression / Dysthymia review    Measure:  Needs PHQ-9 score of 4 or less during index window.  Administer PHQ-9 and if score is 5 or more, send encounter to provider for next steps.    5 - 7 month window range:     PHQ-9 SCORE 8/16/2013 2/7/2014 8/7/2018   PHQ-9 Total Score 13 15 -   PHQ-9 Total Score - - 13       If PHQ-9 recheck is 5 or more, route to provider for next steps.    Patient is due for:  PHQ9  None      Composite cancer screening  Chart review shows that this patient is due/due soon for the following Mammogram and Colonoscopy  Summary:    Patient is due/failing the following:   COLONOSCOPY, MAMMOGRAM and PHQ9    Action needed:   Patient needs referral/order:  Colonoscopy     Type of outreach:    Sent letter.    Questions for provider review:    None                                                                                                                                    Martha Saenz CMA       Chart routed to none .

## 2019-03-27 ENCOUNTER — ANCILLARY PROCEDURE (OUTPATIENT)
Dept: MAMMOGRAPHY | Facility: CLINIC | Age: 64
End: 2019-03-27
Attending: FAMILY MEDICINE
Payer: MEDICARE

## 2019-03-27 DIAGNOSIS — Z12.31 VISIT FOR SCREENING MAMMOGRAM: ICD-10-CM

## 2019-03-27 DIAGNOSIS — Z79.899 HIGH RISK MEDICATION USE: Primary | ICD-10-CM

## 2019-03-27 DIAGNOSIS — Z79.899 NEED FOR PROPHYLACTIC CHEMOTHERAPY: ICD-10-CM

## 2019-03-27 DIAGNOSIS — F41.1 GENERALIZED ANXIETY DISORDER: ICD-10-CM

## 2019-03-27 DIAGNOSIS — F90.9 ATTENTION DEFICIT HYPERACTIVITY DISORDER: ICD-10-CM

## 2019-03-27 LAB
ALBUMIN SERPL-MCNC: 3.8 G/DL (ref 3.4–5)
ALP SERPL-CCNC: 161 U/L (ref 40–150)
ALT SERPL W P-5'-P-CCNC: 12 U/L (ref 0–50)
ANION GAP SERPL CALCULATED.3IONS-SCNC: 7 MMOL/L (ref 3–14)
AST SERPL W P-5'-P-CCNC: 15 U/L (ref 0–45)
BILIRUB SERPL-MCNC: 0.6 MG/DL (ref 0.2–1.3)
BUN SERPL-MCNC: 14 MG/DL (ref 7–30)
CALCIUM SERPL-MCNC: 8.7 MG/DL (ref 8.5–10.1)
CHLORIDE SERPL-SCNC: 105 MMOL/L (ref 94–109)
CO2 SERPL-SCNC: 28 MMOL/L (ref 20–32)
CREAT SERPL-MCNC: 0.78 MG/DL (ref 0.52–1.04)
GFR SERPL CREATININE-BSD FRML MDRD: 81 ML/MIN/{1.73_M2}
GLUCOSE SERPL-MCNC: 83 MG/DL (ref 70–99)
POTASSIUM SERPL-SCNC: 3.5 MMOL/L (ref 3.4–5.3)
PROT SERPL-MCNC: 7.2 G/DL (ref 6.8–8.8)
SODIUM SERPL-SCNC: 140 MMOL/L (ref 133–144)
T4 FREE SERPL-MCNC: 1.07 NG/DL (ref 0.76–1.46)
TSH SERPL DL<=0.005 MIU/L-ACNC: 1.79 MU/L (ref 0.4–4)

## 2019-03-27 PROCEDURE — 84443 ASSAY THYROID STIM HORMONE: CPT | Performed by: PHYSICIAN ASSISTANT

## 2019-03-27 PROCEDURE — 80175 DRUG SCREEN QUAN LAMOTRIGINE: CPT | Mod: 90 | Performed by: PHYSICIAN ASSISTANT

## 2019-03-27 PROCEDURE — 80307 DRUG TEST PRSMV CHEM ANLYZR: CPT | Mod: 90 | Performed by: PHYSICIAN ASSISTANT

## 2019-03-27 PROCEDURE — 84439 ASSAY OF FREE THYROXINE: CPT | Performed by: PHYSICIAN ASSISTANT

## 2019-03-27 PROCEDURE — 36415 COLL VENOUS BLD VENIPUNCTURE: CPT | Performed by: PHYSICIAN ASSISTANT

## 2019-03-27 PROCEDURE — 80053 COMPREHEN METABOLIC PANEL: CPT | Performed by: PHYSICIAN ASSISTANT

## 2019-03-27 PROCEDURE — 77067 SCR MAMMO BI INCL CAD: CPT | Mod: TC

## 2019-03-29 LAB — LAMOTRIGINE SERPL-MCNC: 1.1 UG/ML (ref 2.5–15)

## 2019-04-01 LAB — PAIN DRUG SCR UR W RPTD MEDS: NORMAL

## 2019-04-11 ENCOUNTER — HOSPITAL ENCOUNTER (OUTPATIENT)
Facility: CLINIC | Age: 64
End: 2019-04-11
Attending: SURGERY | Admitting: SURGERY
Payer: MEDICARE

## 2019-04-22 ENCOUNTER — ANESTHESIA EVENT (OUTPATIENT)
Dept: SURGERY | Facility: CLINIC | Age: 64
End: 2019-04-22

## 2019-04-22 ASSESSMENT — LIFESTYLE VARIABLES: TOBACCO_USE: 1

## 2019-04-22 NOTE — ANESTHESIA PREPROCEDURE EVALUATION
Anesthesia Pre-Procedure Evaluation    Patient: Mandi Araiza   MRN: [id number] : 1955          Preoperative Diagnosis: screening    Procedure(s):  COLONOSCOPY    Past Medical History:   Diagnosis Date     Kidney stone 2010     Past Surgical History:   Procedure Laterality Date     CARPAL TUNNEL RELEASE RT/LT  2012    rt     CARPAL TUNNEL RELEASE RT/LT  2013    left     EXCISE TOENAIL(S)  2013    Procedure: EXCISE TOENAIL(S);  Excision of accessory ossicle and ingrown toenail repair right great toe;  Surgeon: Kristian Baer DPM;  Location: [facility] OR     HYSTERECTOMY, PAP NO LONGER INDICATED  2011    fibroid, cyst      LASER HOLMIUM LITHOTRIPSY URETER(S), INSERT STENT, COMBINED  2013    Procedure: COMBINED CYSTOSCOPY, URETEROSCOPY, LASER HOLMIUM LITHOTRIPSY URETER(S), INSERT STENT;  Right ureteroscopy, laser lithotripsy, right ureteral stent placement;  Surgeon: Angel Abbott MD;  Location: [facility] OR       Anesthesia Evaluation     . Pt has had prior anesthetic.            ROS/MED HX    ENT/Pulmonary:     (+)tobacco use, Current use , . .    Neurologic:       Cardiovascular:     (+) Dyslipidemia, ----. : . . . :. .       METS/Exercise Tolerance:     Hematologic:         Musculoskeletal:         GI/Hepatic:         Renal/Genitourinary:     (+) Nephrolithiasis ,       Endo:         Psychiatric:     (+) psychiatric history depression and anxiety      Infectious Disease:         Malignancy:         Other:                                 Lab Results   Component Value Date    WBC 8.1 2014    HGB 13.2 2014    HCT 40.6 2014     2014     2019    POTASSIUM 3.5 2019    CHLORIDE 105 2019    CO2 28 2019    BUN 14 2019    CR 0.78 2019    GLC 83 2019    DOE 8.7 2019    ALBUMIN 3.8 2019    PROTTOTAL 7.2 2019    ALT 12 2019    AST 15 2019    ALKPHOS 161 (H) 2019    BILITOTAL  "0.6 03/27/2019    TSH 1.79 03/27/2019    T4 1.07 03/27/2019       Preop Vitals  BP Readings from Last 3 Encounters:   02/13/19 158/85   10/16/18 122/60   08/20/18 122/60    Pulse Readings from Last 3 Encounters:   10/16/18 90   08/20/18 78   08/07/18 68      Resp Readings from Last 3 Encounters:   02/13/19 16   10/16/18 18   08/20/18 18    SpO2 Readings from Last 3 Encounters:   02/13/19 98%   10/16/18 96%   08/07/18 94%      Temp Readings from Last 1 Encounters:   02/13/19 36.6  C (97.9  F) (Oral)    Ht Readings from Last 1 Encounters:   10/16/18 1.549 m (5' 1\")      Wt Readings from Last 1 Encounters:   02/13/19 88 kg (194 lb)    Estimated body mass index is 36.66 kg/m  as calculated from the following:    Height as of 10/16/18: 1.549 m (5' 1\").    Weight as of 2/13/19: 88 kg (194 lb).                   SALOMON Joshi CRNA  "

## 2019-04-24 RX ORDER — ONDANSETRON 2 MG/ML
4 INJECTION INTRAMUSCULAR; INTRAVENOUS
Status: CANCELLED | OUTPATIENT
Start: 2019-04-24

## 2019-04-24 RX ORDER — SODIUM CHLORIDE, SODIUM LACTATE, POTASSIUM CHLORIDE, CALCIUM CHLORIDE 600; 310; 30; 20 MG/100ML; MG/100ML; MG/100ML; MG/100ML
INJECTION, SOLUTION INTRAVENOUS CONTINUOUS
Status: CANCELLED | OUTPATIENT
Start: 2019-04-24

## 2019-04-24 RX ORDER — LIDOCAINE 40 MG/G
CREAM TOPICAL
Status: CANCELLED | OUTPATIENT
Start: 2019-04-24

## 2019-04-25 ENCOUNTER — ANESTHESIA (OUTPATIENT)
Dept: SURGERY | Facility: CLINIC | Age: 64
End: 2019-04-25

## 2019-04-30 ENCOUNTER — OFFICE VISIT (OUTPATIENT)
Dept: FAMILY MEDICINE | Facility: CLINIC | Age: 64
End: 2019-04-30
Payer: MEDICARE

## 2019-04-30 VITALS
TEMPERATURE: 98.1 F | OXYGEN SATURATION: 100 % | HEIGHT: 61 IN | HEART RATE: 62 BPM | BODY MASS INDEX: 36.06 KG/M2 | RESPIRATION RATE: 18 BRPM | WEIGHT: 191 LBS | DIASTOLIC BLOOD PRESSURE: 80 MMHG | SYSTOLIC BLOOD PRESSURE: 142 MMHG

## 2019-04-30 DIAGNOSIS — R42 VERTIGO: Primary | ICD-10-CM

## 2019-04-30 PROCEDURE — 99213 OFFICE O/P EST LOW 20 MIN: CPT | Performed by: FAMILY MEDICINE

## 2019-04-30 ASSESSMENT — PATIENT HEALTH QUESTIONNAIRE - PHQ9: SUM OF ALL RESPONSES TO PHQ QUESTIONS 1-9: 15

## 2019-04-30 ASSESSMENT — MIFFLIN-ST. JEOR: SCORE: 1363.75

## 2019-04-30 NOTE — PATIENT INSTRUCTIONS
Our Clinic hours are:  Mondays    7:20 am - 7 pm  Tues -  Fri  7:20 am - 5 pm    Clinic Phone: [phone]    The clinic lab opens at 7:30 am Mon - Fri and appointments are required.    [facility]. [phone]  Monday  8 am - 7pm  Tues - Fri 8 am - 5:30 pm

## 2019-04-30 NOTE — PROGRESS NOTES
SUBJECTIVE:   Mandi Araiza is a 63 year old female who presents to clinic today for the following health issues:      HPI   .  Chief Complaint   Patient presents with     Vertigo     Musculoskeletal Problem     left hand pain from a fall 6 weeks ago - patient would rate the pain at a 6 .       Dizziness  Onset: x 2 weeks     Description:   Do you feel faint:  no   Does it feel like the surroundings (bed, room) are moving: YES  Unsteady/off balance: YES  Have you passed out or fallen: YES    Intensity: severe    Progression of Symptoms:  worsening    Accompanying Signs & Symptoms:  Heart palpitations: no   Nausea, vomiting: YES  Weakness in arms or legs: no   Fatigue: YES  Vision or speech changes: YES  Ringing in ears (Tinnitus): no   Hearing Loss: no     History:   Head trauma/concussion hx: no   Previous similar symptoms: YES  Recent bleeding history: no     Precipitating factors:   Worse with activity or head movement: YES  Any new medications (BP?): no   Alcohol/drug abuse/withdrawal: no     Alleviating factors:   Does staying in a fixed position give relief:  YES    Therapies Tried and outcome: none  Additional history: as documented    Reviewed and updated as needed this visit by clinical staff  Tobacco  Allergies  Meds  Med Hx  Surg Hx  Fam Hx  Soc Hx        Reviewed and updated as needed this visit by Provider           Further history obtained, clarified or corrected by physician:  She complains of vertigo and falling multiple times over the last several months.  It may have been actually over the last year or more.  She had the exact same type symptoms for 5 years ago and was in the emergency room at one time and had a negative brain MRI.  She states she feels a spinning type sensation which she calls vertigo and then always stumbles to the left and several times is actually fallen down.  She does not lose consciousness.    OBJECTIVE:  /80   Pulse 62   Temp 98.1  F (36.7  C)   Resp 18   " Ht 1.549 m (5' 1\")   Wt 86.6 kg (191 lb)   LMP 10/16/2011   SpO2 100%   BMI 36.09 kg/m    LUNGS: clear to auscultation, normal breath sounds  CV: RRR without murmur  ABD: BS+, soft, nontender, no masses, no hepatosplenomegaly  EXTREMITIES: without joint tenderness, swelling or erythema.  No muscle tenderness or abnormality.  SKIN: No rashes or abnormalities  NEURO:non focal exam    ASSESSMENT:  Vertigo    PLAN:  I think she needs a recheck of brain MRI and likely neurology consultation following that.    Orders Placed This Encounter     DEPRESSION ACTION PLAN (DAP)     MR Brain w/o & w Contrast     NEUROLOGY ADULT REFERRAL       "

## 2019-04-30 NOTE — LETTER
My Depression Action Plan  Name: Mandi Araiza   Date of Birth 1955  Date: 4/30/2019    My doctor: Chaz Tineo   My clinic: [facility] [place]  [address]  [facility] [address]  [phone]          GREEN    ZONE   Good Control    What it looks like:     Things are going generally well. You have normal up s and down s. You may even feel depressed from time to time, but bad moods usually last less than a day.   What you need to do:  1. Continue to care for yourself (see self care plan)  2. Check your depression survival kit and update it as needed  3. Follow your physician s recommendations including any medication.  4. Do not stop taking medication unless you consult with your physician first.           YELLOW         ZONE Getting Worse    What it looks like:     Depression is starting to interfere with your life.     It may be hard to get out of bed; you may be starting to isolate yourself from others.    Symptoms of depression are starting to last most all day and this has happened for several days.     You may have suicidal thoughts but they are not constant.   What you need to do:     1. Call your care team, your response to treatment will improve if you keep your care team informed of your progress. Yellow periods are signs an adjustment may need to be made.     2. Continue your self-care, even if you have to fake it!    3. Talk to someone in your support network    4. Open up your depression survival kit           RED    ZONE Medical Alert - Get Help    What it looks like:     Depression is seriously interfering with your life.     You may experience these or other symptoms: You can t get out of bed most days, can t work or engage in other necessary activities, you have trouble taking care of basic hygiene, or basic responsibilities, thoughts of suicide or death that will not go away, self-injurious behavior.     What you need to do:  1. Call your care team and  request a same-day appointment. If they are not available (weekends or after hours) call your local crisis line, emergency room or 911.            Depression Self Care Plan / Survival Kit    Self-Care for Depression  Here s the deal. Your body and mind are really not as separate as most people think.  What you do and think affects how you feel and how you feel influences what you do and think. This means if you do things that people who feel good do, it will help you feel better.  Sometimes this is all it takes.  There is also a place for medication and therapy depending on how severe your depression is, so be sure to consult with your medical provider and/ or Behavioral Health Consultant if your symptoms are worsening or not improving.     In order to better manage my stress, I will:    Exercise  Get some form of exercise, every day. This will help reduce pain and release endorphins, the  feel good  chemicals in your brain. This is almost as good as taking antidepressants!  This is not the same as joining a gym and then never going! (they count on that by the way ) It can be as simple as just going for a walk or doing some gardening, anything that will get you moving.      Hygiene   Maintain good hygiene (Get out of bed in the morning, Make your bed, Brush your teeth, Take a shower, and Get dressed like you were going to work, even if you are unemployed).  If your clothes don't fit try to get ones that do.    Diet  I will strive to eat foods that are good for me, drink plenty of water, and avoid excessive sugar, caffeine, alcohol, and other mood-altering substances.  Some foods that are helpful in depression are: complex carbohydrates, B vitamins, flaxseed, fish or fish oil, fresh fruits and vegetables.    Psychotherapy  I agree to participate in Individual Therapy (if recommended).    Medication  If prescribed medications, I agree to take them.  Missing doses can result in serious side effects.  I understand that  drinking alcohol, or other illicit drug use, may cause potential side effects.  I will not stop my medication abruptly without first discussing it with my provider.    Staying Connected With Others  I will stay in touch with my friends, family members, and my primary care provider/team.    Use your imagination  Be creative.  We all have a creative side; it doesn t matter if it s oil painting, sand castles, or mud pies! This will also kick up the endorphins.    Witness Beauty  (AKA stop and smell the roses) Take a look outside, even in mid-winter. Notice colors, textures. Watch the squirrels and birds.     Service to others  Be of service to others.  There is always someone else in need.  By helping others we can  get out of ourselves  and remember the really important things.  This also provides opportunities for practicing all the other parts of the program.    Humor  Laugh and be silly!  Adjust your TV habits for less news and crime-drama and more comedy.    Control your stress  Try breathing deep, massage therapy, biofeedback, and meditation. Find time to relax each day.     My support system    Clinic Contact:  Phone number:    Contact 1:  Phone number:    Contact 2:  Phone number:    Nondenominational/:  Phone number:    Therapist:  Phone number:    Local crisis center:    Phone number:    Other community support:  Phone number:

## 2019-07-03 DIAGNOSIS — M54.5 LOW BACK PAIN, UNSPECIFIED BACK PAIN LATERALITY, UNSPECIFIED CHRONICITY, WITH SCIATICA PRESENCE UNSPECIFIED: ICD-10-CM

## 2019-07-03 NOTE — TELEPHONE ENCOUNTER
"Requested Prescriptions   Pending Prescriptions Disp Refills     sulindac (CLINORIL) 200 MG tablet [Pharmacy Med Name: SULINDAC 200MG TAB] 56 tablet 3     Sig: TAKE 1 TABLET BY MOUTH TWICE A DAY       NSAID Medications Failed - 7/3/2019  9:32 AM        Failed - Blood pressure under 140/90 in past 12 months     BP Readings from Last 3 Encounters:   04/30/19 142/80   02/13/19 158/85   10/16/18 122/60                 Failed - Normal CBC on file in past 12 months     Recent Labs   Lab Test 08/27/14  1100   WBC 8.1   RBC 4.46   HGB 13.2   HCT 40.6                    Passed - Normal ALT on file in past 12 months     Recent Labs   Lab Test 03/27/19  1610   ALT 12             Passed - Normal AST on file in past 12 months     Recent Labs   Lab Test 03/27/19  1610   AST 15             Passed - Recent (12 mo) or future (30 days) visit within the authorizing provider's specialty     Patient had office visit in the last 12 months or has a visit in the next 30 days with authorizing provider or within the authorizing provider's specialty.  See \"Patient Info\" tab in inbasket, or \"Choose Columns\" in Meds & Orders section of the refill encounter.              Passed - Patient is age 6-64 years        Passed - Medication is active on med list        Passed - No active pregnancy on record        Passed - Normal serum creatinine on file in past 12 months     Recent Labs   Lab Test 03/27/19  1610   CR 0.78             Passed - No positive pregnancy test in past 12 months        Last Written Prescription Date:  2/25/19  Last Fill Quantity: 56,  # refills: 3   Last office visit: 4/30/2019 with prescribing provider:  Noman   Future Office Visit:      "

## 2019-07-05 RX ORDER — SULINDAC 200 MG/1
TABLET ORAL
Qty: 56 TABLET | Refills: 3 | Status: SHIPPED | OUTPATIENT
Start: 2019-07-05 | End: 2019-10-16

## 2019-07-05 NOTE — TELEPHONE ENCOUNTER
Routing refill request to provider for review/approval because:  Labs not current:  See Below  BPs not at goal    Delia SKY RN

## 2019-10-17 DIAGNOSIS — E78.5 HYPERLIPIDEMIA LDL GOAL <130: Primary | ICD-10-CM

## 2019-10-17 NOTE — TELEPHONE ENCOUNTER
"Requested Prescriptions   Pending Prescriptions Disp Refills     atorvastatin (LIPITOR) 40 MG tablet [Pharmacy Med Name: ATORVASTATIN 40MG TAB] 28 tablet      Sig: TAKE 1 TABLET BY MOUTH DAILY       Statins Protocol Failed - 10/17/2019  3:09 PM        Failed - LDL on file in past 12 months     Recent Labs   Lab Test 08/16/13  1158                Passed - No abnormal creatine kinase in past 12 months     No lab results found.             Passed - Recent (12 mo) or future (30 days) visit within the authorizing provider's specialty     Patient has had an office visit with the authorizing provider or a provider within the authorizing providers department within the previous 12 mos or has a future within next 30 days. See \"Patient Info\" tab in inbasket, or \"Choose Columns\" in Meds & Orders section of the refill encounter.              Passed - Medication is active on med list        Passed - Patient is age 18 or older        Passed - No active pregnancy on record        Passed - No positive pregnancy test in past 12 months        Last Written Prescription Date:  10/16/2018  Last Fill Quantity: 90,  # refills: 3   Last office visit: 4/30/2019 with prescribing provider:  Noman   Future Office Visit:      "

## 2019-10-18 RX ORDER — ATORVASTATIN CALCIUM 40 MG/1
TABLET, FILM COATED ORAL
Qty: 90 TABLET | Refills: 3 | Status: SHIPPED | OUTPATIENT
Start: 2019-10-18

## 2019-10-18 NOTE — TELEPHONE ENCOUNTER
Patient reports she takes Lipitor 40 mg BID. Patient would like to continue with this dose. The original RX was written for 80 mg daily.  Patient last lab was in 2013. Please review and advise.    Thank you    Shayy BROWN RN